# Patient Record
Sex: FEMALE | Race: WHITE | NOT HISPANIC OR LATINO | Employment: UNEMPLOYED | URBAN - METROPOLITAN AREA
[De-identification: names, ages, dates, MRNs, and addresses within clinical notes are randomized per-mention and may not be internally consistent; named-entity substitution may affect disease eponyms.]

---

## 2018-04-25 ENCOUNTER — HOSPITAL ENCOUNTER (EMERGENCY)
Facility: OTHER | Age: 38
Discharge: HOME | End: 2018-04-25
Attending: EMERGENCY MEDICINE
Payer: MEDICAID

## 2018-04-25 VITALS
HEART RATE: 105 BPM | BODY MASS INDEX: 20.61 KG/M2 | RESPIRATION RATE: 18 BRPM | DIASTOLIC BLOOD PRESSURE: 76 MMHG | HEIGHT: 62 IN | TEMPERATURE: 98 F | OXYGEN SATURATION: 99 % | WEIGHT: 112 LBS | SYSTOLIC BLOOD PRESSURE: 152 MMHG

## 2018-04-25 DIAGNOSIS — L02.414 ABSCESS OF LEFT ARM: Primary | ICD-10-CM

## 2018-04-25 LAB
B-HCG UR QL: NEGATIVE
CTP QC/QA: YES

## 2018-04-25 PROCEDURE — 10061 I&D ABSCESS COMP/MULTIPLE: CPT | Mod: LT

## 2018-04-25 PROCEDURE — 81025 URINE PREGNANCY TEST: CPT | Performed by: EMERGENCY MEDICINE

## 2018-04-25 PROCEDURE — 99283 EMERGENCY DEPT VISIT LOW MDM: CPT | Mod: 25

## 2018-04-25 PROCEDURE — 25000003 PHARM REV CODE 250: Performed by: EMERGENCY MEDICINE

## 2018-04-25 PROCEDURE — 10060 I&D ABSCESS SIMPLE/SINGLE: CPT

## 2018-04-25 RX ORDER — LIDOCAINE HYDROCHLORIDE 20 MG/ML
10 INJECTION, SOLUTION INFILTRATION; PERINEURAL
Status: COMPLETED | OUTPATIENT
Start: 2018-04-25 | End: 2018-04-25

## 2018-04-25 RX ORDER — IBUPROFEN 400 MG/1
800 TABLET ORAL
Status: COMPLETED | OUTPATIENT
Start: 2018-04-25 | End: 2018-04-25

## 2018-04-25 RX ORDER — SULFAMETHOXAZOLE AND TRIMETHOPRIM 800; 160 MG/1; MG/1
1 TABLET ORAL
Status: COMPLETED | OUTPATIENT
Start: 2018-04-25 | End: 2018-04-25

## 2018-04-25 RX ORDER — SULFAMETHOXAZOLE AND TRIMETHOPRIM 800; 160 MG/1; MG/1
1 TABLET ORAL 2 TIMES DAILY
Qty: 14 TABLET | Refills: 0 | Status: SHIPPED | OUTPATIENT
Start: 2018-04-25 | End: 2018-05-02

## 2018-04-25 RX ORDER — IBUPROFEN 600 MG/1
600 TABLET ORAL EVERY 6 HOURS PRN
Qty: 20 TABLET | Refills: 0 | Status: SHIPPED | OUTPATIENT
Start: 2018-04-25

## 2018-04-25 RX ADMIN — LIDOCAINE HYDROCHLORIDE 10 ML: 20 INJECTION, SOLUTION INFILTRATION; PERINEURAL at 06:04

## 2018-04-25 RX ADMIN — SULFAMETHOXAZOLE AND TRIMETHOPRIM 1 TABLET: 800; 160 TABLET ORAL at 06:04

## 2018-04-25 RX ADMIN — IBUPROFEN 800 MG: 400 TABLET, FILM COATED ORAL at 06:04

## 2018-04-25 NOTE — ED PROVIDER NOTES
"Encounter Date: 4/25/2018    SCRIBE #1 NOTE: I, Cathy Martinez, am scribing for, and in the presence of, Dr. Fabian.       History     Chief Complaint   Patient presents with    Abscess     to left upper arm x 3 days     Time seen by provider: 6:21 AM    This is a 38 y.o. female who presents with complaint of abscess for three days. She has had the lesion for several years, but she noticed swelling and redness three days ago.  She reports a recent admission at [facility] for workup for chest pain with associated shortness of breath.  She states that she was "worked up fully "including ultrasounds of the legs.  Patient does have some discharge paperwork and it appears she was treated with prophylactic Lovenox during that admission.  She reports SOB related to smoking. She denies fever, chills, chest pain, abdominal pain, headache, numbness, or weakness. She reports use of tobacco (decreased from 0.5 PPD to 5 cigarettes per day) and occasional use of alcohol. She notes quitting cocaine 20 years ago, and denies any recent use of illicit drugs.      The history is provided by the patient.     Review of patient's allergies indicates:   Allergen Reactions    Keflex [cephalexin]     Mushroom     Penicillins      History reviewed. No pertinent past medical history.  Past Surgical History:   Procedure Laterality Date    TONSILLECTOMY       History reviewed. No pertinent family history.  Social History   Substance Use Topics    Smoking status: Current Every Day Smoker     Types: Cigarettes    Smokeless tobacco: Never Used    Alcohol use Yes     Review of Systems   Constitutional: Negative for chills and fever.   HENT: Negative for congestion and sore throat.    Eyes: Negative for visual disturbance.   Respiratory: Positive for shortness of breath. Negative for cough.    Cardiovascular: Negative for chest pain and palpitations.   Gastrointestinal: Negative for abdominal pain, diarrhea, nausea and vomiting.   Genitourinary: " "Negative for decreased urine volume, dysuria and vaginal discharge.   Musculoskeletal: Negative for back pain, joint swelling, neck pain and neck stiffness.   Skin: Positive for color change and wound. Negative for rash.   Neurological: Negative for weakness, numbness and headaches.   Psychiatric/Behavioral: Negative for confusion. The patient is not nervous/anxious.        Physical Exam     Initial Vitals [04/25/18 0611]   BP Pulse Resp Temp SpO2   (!) 154/81 (!) 134 18 97.5 °F (36.4 °C) 98 %      MAP       105.33         Vitals:    04/25/18 0611 04/25/18 0628 04/25/18 0636 04/25/18 0737   BP: (!) 154/81      Pulse: (!) 134 (!) 122 (!) 114 102   Resp: 18      Temp: 97.5 °F (36.4 °C)      TempSrc: Oral      SpO2: 98% 99% 99% 99%   Weight: 50.8 kg (112 lb)      Height: 5' 2" (1.575 m)       04/25/18 0807   BP: (!) 152/76   Pulse: 105   Resp: 18   Temp:    TempSrc:    SpO2: 99%   Weight:    Height:        Physical Exam    Nursing note and vitals reviewed.  Constitutional: She appears well-developed and well-nourished. She is not diaphoretic. No distress.   HENT:   Head: Normocephalic and atraumatic.   Nose: Nose normal.   Eyes: Conjunctivae and EOM are normal. No scleral icterus.   Neck: Normal range of motion. Neck supple.   Cardiovascular: Regular rhythm and normal heart sounds. Tachycardia present.  Exam reveals no gallop and no friction rub.    No murmur heard.  Pulmonary/Chest: Breath sounds normal. No respiratory distress. She has no wheezes. She has no rhonchi. She has no rales.   Abdominal: Soft. There is no tenderness.   Musculoskeletal: Normal range of motion.   Neurological: She is alert and oriented to person, place, and time. She has normal strength. No cranial nerve deficit.   Skin: Skin is warm and dry. There is erythema.   Left upper arm has an area that is about 4 cm by 4 cm with erythema, fluctuance, and tenderness. Does not involve elbow joint.   Psychiatric: She has a normal mood and affect. " "Thought content normal.         ED Course   I & D - Incision and Drainage  Date/Time: 4/25/2018 7:50 AM  Performed by: MYKE BABCOCK  Authorized by: MYKE BABCOCK   Consent Done: Not Needed  Type: abscess  Body area: upper extremity  Location details: left arm  Anesthesia: local infiltration    Anesthesia:  Local Anesthetic: lidocaine 2% without epinephrine  Anesthetic total: 2 mL  Patient sedated: no  Scalpel size: 11  Complexity: complex  Drainage: pus  Drainage amount: copious  Wound treatment: incision,  drainage and  wound packed  Packing material: 1/4 in gauze  Complications: No  Specimens: No  Implants: No  Patient tolerance: Patient tolerated the procedure well with no immediate complications        Labs Reviewed   POCT URINE PREGNANCY             Medical Decision Making:   Clinical Tests:   Lab Tests: Ordered and Reviewed  ED Management:  Emergent evaluation of 38-year-old female with complaint of abscess to the arm.  Vital signs reveal tachycardia, afebrile.  Patient states that she "has had an elevated heart rate for some time."  Physical exam reveals an abscess left upper arm, no involvement of the joint or septic joint.  This was incised and drained with copious purulent drainage.  Patient does admit to a recent hospitalization with workup for chest pain, as well as chronic shortness of breath.  I considered possibility of PE, but do not think she needs an emergent workup at this time given that she has recently had one and heart rate normalized during ER course.  She was discharged in good condition after I&D, Bactrim, ibuprofen, and given prescriptions for Bactrim and ibuprofen.  She was encouraged to follow-up with her PCP or to return for new or worsening symptom.  She was advised to remove the packing herself in 24 hours.            Scribe Attestation:   Scribe #1: I performed the above scribed service and the documentation accurately describes the services I performed. I attest to the " accuracy of the note.    Attending Attestation:           Physician Attestation for Scribe:  Physician Attestation Statement for Scribe #1: I, Dr. Fabian, reviewed documentation, as scribed by Cathy Martinez in my presence, and it is both accurate and complete.                    Clinical Impression:     1. Abscess of left arm                             Shasha Fabian MD  04/25/18 0811

## 2018-04-25 NOTE — ED NOTES
Pt given d/c instructions and rx, states understanding of both, aaox4, rrr unlabored, nadn, ambulatory to lobby without difficulty